# Patient Record
Sex: MALE | Race: BLACK OR AFRICAN AMERICAN | Employment: FULL TIME | URBAN - METROPOLITAN AREA
[De-identification: names, ages, dates, MRNs, and addresses within clinical notes are randomized per-mention and may not be internally consistent; named-entity substitution may affect disease eponyms.]

---

## 2024-01-16 ENCOUNTER — OFFICE VISIT (OUTPATIENT)
Dept: URGENT CARE | Facility: CLINIC | Age: 34
End: 2024-01-16
Payer: COMMERCIAL

## 2024-01-16 VITALS
TEMPERATURE: 99.8 F | OXYGEN SATURATION: 94 % | DIASTOLIC BLOOD PRESSURE: 76 MMHG | BODY MASS INDEX: 25.22 KG/M2 | HEIGHT: 75 IN | SYSTOLIC BLOOD PRESSURE: 142 MMHG | WEIGHT: 202.8 LBS | HEART RATE: 84 BPM

## 2024-01-16 DIAGNOSIS — G44.201 ACUTE INTRACTABLE TENSION-TYPE HEADACHE: ICD-10-CM

## 2024-01-16 DIAGNOSIS — B34.9 VIRAL ILLNESS: Primary | ICD-10-CM

## 2024-01-16 LAB
POC RAPID INFLUENZA A: NEGATIVE
POC RAPID INFLUENZA B: NEGATIVE

## 2024-01-16 PROCEDURE — 99203 OFFICE O/P NEW LOW 30 MIN: CPT

## 2024-01-16 PROCEDURE — 87636 SARSCOV2 & INF A&B AMP PRB: CPT

## 2024-01-16 PROCEDURE — 87804 INFLUENZA ASSAY W/OPTIC: CPT

## 2024-01-16 RX ORDER — OMEPRAZOLE 20 MG/1
20 TABLET, DELAYED RELEASE ORAL
COMMUNITY
Start: 2023-01-04

## 2024-01-16 RX ORDER — NAPROXEN 500 MG/1
500 TABLET ORAL 2 TIMES DAILY
COMMUNITY
Start: 2023-03-06 | End: 2023-03-16

## 2024-01-16 RX ORDER — BUTALBITAL, ACETAMINOPHEN AND CAFFEINE 50; 325; 40 MG/1; MG/1; MG/1
1 TABLET ORAL EVERY 6 HOURS PRN
Qty: 20 TABLET | Refills: 0 | Status: SHIPPED | OUTPATIENT
Start: 2024-01-16 | End: 2024-01-21

## 2024-01-16 RX ORDER — SERTRALINE HYDROCHLORIDE 50 MG/1
50 TABLET, FILM COATED ORAL
COMMUNITY
Start: 2023-01-04

## 2024-01-16 RX ORDER — CYCLOBENZAPRINE HCL 10 MG
TABLET ORAL
COMMUNITY
Start: 2023-03-06

## 2024-01-16 ASSESSMENT — ENCOUNTER SYMPTOMS
SHORTNESS OF BREATH: 0
PALPITATIONS: 0
WEAKNESS: 1
WHEEZING: 0
COUGH: 1
CHILLS: 1
DIARRHEA: 1
DIAPHORESIS: 1
ARTHRALGIAS: 1
CHEST TIGHTNESS: 0
NAUSEA: 0
HEADACHES: 1
ABDOMINAL PAIN: 0
VOMITING: 0
FEVER: 1
TROUBLE SWALLOWING: 0
RHINORRHEA: 0
SORE THROAT: 0
MYALGIAS: 1

## 2024-01-16 NOTE — LETTER
January 17, 2024     Patient: Sancho French II   YOB: 1990   Date of Visit: 1/16/2024       To Whom It May Concern:    Sancho French was seen in my clinic on 1/16/2024 at 3:50 pm. Please excuse Sancho for his absence from work on 1-16-24 and until 1-24-24. Pending Sx improvement and fever free without medications. Per CDC guidelines.     If you have any questions or concerns, please don't hesitate to call.     [phone]    Sincerely,         Robert LOAIZA LPN.         CC: No Recipients

## 2024-01-16 NOTE — LETTER
January 16, 2024     Patient: Sancho French II   YOB: 1990   Date of Visit: 1/16/2024       To Whom It May Concern:    Sancho French was seen in my clinic on 1/16/2024 at 3:50 pm. Please excuse Sancho for his absence from work 1/16/24-1/18/24 due to illness. He may return 1/18/24 if COVID/flu negative and symptoms improved.     If you have any questions or concerns, please don't hesitate to call.         Sincerely,         Keily Ordonez PA-C        CC: No Recipients

## 2024-01-16 NOTE — PATIENT INSTRUCTIONS
"HEADACHE / MIGRAINE     - TORADOL injection was offered in office, but patient declined at this time    - BUTALBITAL-APAP-CAFFEINE (Fioricet) was prescribed for pain relief - Take 1 every 4 hours as needed, may take 2 at a time if 1 not relieving pain, but no more than 6 in 24 hours     - Recommended rest in a dark room without bright electronics    - May try a warm shower for temporary relief   - Be sure to drink plenty of water (at least 6 12oz water bottles) daily to prevent dehydration headaches   - Newer research has suggested that daily doses of 400 mg Vitamin B2 (Riboflavin) and/or 400-600 mg Magnesium oxide daily can help prevent migraine attacks and is safely combined with other medications    - Triggers for migraines can include, but are not limited to: dehydration, interrupted sleep or oversleeping, missed/late medication dosing, caffeine or lack of caffeine if regular consumer, skipped meals, bright lights, drastic changes in weather like storms or pressure changes, head trauma, and sinus congestion/allergies   - There are free apps for your phone like \"Migraine Buddy\" which can help you track your migraine frequency, triggers, and relief methods.       - If migraines are frequent, talk to your PCP about preventative or abortive prescription migraine treatments.     UPPER RESPIRATORY INFECTION       - In office rapid flu testing was performed with negative results   - COVID-19 and flu PCR testing performed in office today, your results can take 24-72 hours to come back     - Mucinex is recommended to help thin mucus making it easier to clear and reduce nasal congestion    - FLUTICASONE (Flonase) is an OTC nasal steroid that helps reduce swelling in your nasal passages, may use saline rinse (Neti pot) to clear sinus cavities before use      - Use Ibuprofen (Advil) or Acetaminophen (Tylenol) at home for headache and fever reduction if needed   - Recommend use of OTC cough and cold syrups like " Dayquil/Nyquil or Mucinex containing guaifenesin (thins mucus), phenylephrine (nasal decongestant) , and/or dextromethorphan (cough suppressant) if not using Bromfed RX       - Increase rest and fluids to recover sooner and loosen mucus     - Warm and cold liquids such as tea, broth-based soup, or popsicles can help temporarily relieve pain in throat     - May use a humidifier, cough drops, saline nasal wash (Neti pot) for symptom relief     - Avoid cigarette smoke and do not vape as this will continue to irritate airway (may use nicotine gum or patches if nicotine dependent)    - While you are awaiting COVID-19 test results, you are to self-quarantine in your home. Use a separate room and restroom if possible. Wear a mask, gloves when possible and stay six feet from others in your home. You may search the CDC website for official guidelines on proper self-quarantining procedures. Only leave the home if there is worsening shortness of breath, chest pain, or continued fever that cannot be controlled (or any other new/concerning symptoms), in which case you should call or report to your local ER.  If you cannot safely get to the ER by private vehicle, please call 911 if needed.

## 2024-01-16 NOTE — PROGRESS NOTES
"Subjective     Sancho French II is a 33 y.o. male who presents for Headache.    Patient presents today with headache, chills/sweats, body aches and loose stools for the last 2 days. He reports that the first day he had a cough and sore throat which has improved, but today he has had 3-4 loose stools, headache, and severe body aches. Patient reports trying OTC medications including Acetaminophen/Ibuprofen and mucinex max with minimal relief.       History provided by:  Patient and medical records  Headache  Associated symptoms: cough, diarrhea, fever, myalgias and weakness    Associated symptoms: no abdominal pain, no congestion, no nausea, no sore throat and no vomiting        /76 (BP Location: Left arm, Patient Position: Sitting, BP Cuff Size: Small adult)   Pulse 84   Temp 37.7 °C (99.8 °F) (Oral)   Ht 1.905 m (6' 3\")   Wt 92 kg (202 lb 12.8 oz)   SpO2 94%   BMI 25.35 kg/m²    All vitals have been reviewed and are stable.    Review of Systems   Constitutional:  Positive for chills, diaphoresis and fever.   HENT:  Negative for congestion, rhinorrhea, sore throat and trouble swallowing.    Respiratory:  Positive for cough. Negative for chest tightness, shortness of breath and wheezing.    Cardiovascular:  Negative for chest pain and palpitations.   Gastrointestinal:  Positive for diarrhea. Negative for abdominal pain, nausea and vomiting.   Musculoskeletal:  Positive for arthralgias and myalgias.   Skin: Negative.  Negative for rash.   Neurological:  Positive for weakness and headaches.       Objective   Physical Exam  Vitals and nursing note reviewed.   Constitutional:       General: He is not in acute distress.     Appearance: Normal appearance. He is ill-appearing.   HENT:      Head: Normocephalic and atraumatic.      Right Ear: External ear normal.      Left Ear: External ear normal.      Nose: Mucosal edema, congestion and rhinorrhea present.      Mouth/Throat:      Lips: Pink.      Mouth: Mucous " membranes are moist.      Palate: No lesions.      Pharynx: Uvula midline. Oropharyngeal exudate and posterior oropharyngeal erythema present.      Tonsils: No tonsillar exudate.   Eyes:      Extraocular Movements: Extraocular movements intact.      Conjunctiva/sclera: Conjunctivae normal.      Right eye: Right conjunctiva is not injected.      Left eye: Left conjunctiva is not injected.      Pupils: Pupils are equal, round, and reactive to light.   Cardiovascular:      Rate and Rhythm: Normal rate and regular rhythm.      Heart sounds: Normal heart sounds.   Pulmonary:      Effort: Pulmonary effort is normal. No respiratory distress.      Breath sounds: Normal breath sounds and air entry. Transmitted upper airway sounds present. No stridor. No wheezing, rhonchi or rales.   Abdominal:      General: Abdomen is flat.      Palpations: Abdomen is soft.      Tenderness: There is no abdominal tenderness.   Musculoskeletal:         General: Normal range of motion.      Cervical back: Normal range of motion and neck supple.   Lymphadenopathy:      Cervical: No cervical adenopathy.   Skin:     General: Skin is warm and dry.   Neurological:      General: No focal deficit present.      Mental Status: He is alert and oriented to person, place, and time. Mental status is at baseline.   Psychiatric:         Mood and Affect: Mood normal.         Behavior: Behavior normal.         Assessment/Plan   Problem List Items Addressed This Visit    None  Visit Diagnoses       Viral illness    -  Primary    Relevant Medications    butalbital-acetaminophen-caff -40 mg tablet    Other Relevant Orders    POCT Influenza A/B manually resulted (Completed)    Sars-CoV-2 and Influenza A/B PCR    Acute intractable tension-type headache        Relevant Medications    butalbital-acetaminophen-caff -40 mg tablet            Red flags for reporting to ER have been reviewed with the patient.    Current diagnosis, any medication changes, and  all in-office lab or radiologic results have been reviewed with the patient at the time of the visit.   If symptoms do not improve or worsen, patient is to follow up with PCP or report to the emergency room.   Patient is alert and oriented x3 and non-toxic appearing. Vital signs are stable.   Patient and/or guardian has sufficient decision-making capabilities at this time and reports understanding and agreement with the treatment plan made through shared decision-making.

## 2024-01-17 LAB
FLUAV RNA RESP QL NAA+PROBE: NOT DETECTED
FLUBV RNA RESP QL NAA+PROBE: NOT DETECTED
SARS-COV-2 RNA RESP QL NAA+PROBE: DETECTED